# Patient Record
Sex: FEMALE | Race: WHITE | HISPANIC OR LATINO | Employment: OTHER | URBAN - METROPOLITAN AREA
[De-identification: names, ages, dates, MRNs, and addresses within clinical notes are randomized per-mention and may not be internally consistent; named-entity substitution may affect disease eponyms.]

---

## 2024-05-21 ENCOUNTER — MEDICAL CORRESPONDENCE (OUTPATIENT)
Dept: HEALTH INFORMATION MANAGEMENT | Facility: CLINIC | Age: 64
End: 2024-05-21

## 2024-05-22 ENCOUNTER — TELEPHONE (OUTPATIENT)
Dept: SURGERY | Facility: CLINIC | Age: 64
End: 2024-05-22
Payer: COMMERCIAL

## 2024-05-22 ENCOUNTER — TRANSCRIBE ORDERS (OUTPATIENT)
Dept: OTHER | Age: 64
End: 2024-05-22

## 2024-05-22 DIAGNOSIS — K64.8 INTERNAL HEMORRHOIDS: Primary | ICD-10-CM

## 2024-05-22 NOTE — TELEPHONE ENCOUNTER
Patient confirmed scheduled appointment:  Date: 6/20/24  Time: 11:00 am  Visit type: New Patient  Provider: Dr. Geller  Location: [facility]  Testing/imaging: n/a  Additional notes: referred by Dr. Sandra for hemorrhoids

## 2024-05-23 NOTE — TELEPHONE ENCOUNTER
Diagnosis, Referred by & from: [facility]    Appt date: 6/20/2024   NOTES STATUS DETAILS   OFFICE NOTE from referring provider N/A    OFFICE NOTE from other specialist Care Everywhere [facility]:   10/30/2019 OV with SAEED Geller   MEDICATION LIST Internal    LABS     ANAL PAP/CEA N/A    BIOPSIES/PATHOLOGY RELATED TO DIAGNOSIS N/A    DIAGNOSTIC PROCEDURES     PFC TESTING (from the Pelvic floor center includes Manometry, PDNL, EMG, etc.) N/A    COLONOSCOPY (most recent all time after 5 years) Care Everywhere 1/13/2020 - [facility]  11/6/2014 - [facility] SIGMOIDOSCOPY N/A    UPPER ENDOSCOPY (EGD) N/A    ERCP N/A      Records Requested  05/23/24    Facility  [facility]  Fax: [phone]   Outcome Request sent to [facility] for records     5/28/2024 [facility] BRAYAN stated the only records they have for patient is a TE with Nia regarding banding. No banding was done at [facility].       Records Requested  06/20/24    Facility    Fax:   Outcome BRAYAN representative will be sending Colonoscopy report from 1.13.2020. Done through [facility].    **Colonoscopy report has been received, sent to [facility] to be scanned into chart, marked at STAT.

## 2024-06-19 NOTE — PROGRESS NOTES
Colon and Rectal Surgery Consult Clinic Note    Date: 2024     Referring provider:  MD AN[facility]  [address] [place] [address]     RE: Nia Díaz  : 1960  MANUEL: 2024    Reason for visit: hemorrhoids     HPI: Nia Díaz is a very pleasant 63 year old female referred from [facility].     Today, Nia is feeling well.  She has had hemorrhoid banding with Dr. Sandra for the past year with dramatic improvement in her rectal bleeding.  She finds managing her dietary fiber can be tough because she will get frequent stools with too much fiber.  She notes an area of persistent hemorrhoidal prolapse that affects her with urination and defecation and interferes with travel.    Colonoscopy with polypectomy (14)   PROCEDURE DESCRIPTION: In the left lateral decubitus position, the colonoscope was inserted in the rectum and passed without difficulty to the cecum. Cecal landmarks including appendiceal orifice and ileocecal valve confirmed position. The scope was carefully withdrawn examining all areas of the colon. The prep was very good. The cecum, ascending transverse, descending and sigmoid colons were all carefully examined. Extensive sigmoid diverticulosis was noted. There was no evidence for diverticulitia. 2 sigmoid polyps were identified. They both measured approximately 5 mm in size. They were both cold snared and retrieved. Retroflexion in the rectum revealed small internal hemorrhoids. The scope was removed and the patient tolerated the procedure well. There were no immediate complications.     PLAN:  Repeat colonoscopy in 5 years     Pathology (2014):      2020 colonoscopy not yet available for review    Medical history:  No past medical history on file.    Surgical history:  No past surgical history on file.    Problem list:  There are no problems to display for this patient.      Medications:  No current outpatient  medications on file.       Allergies:  Not on File    Family history:  No family history on file.    Social history:  Social History     Tobacco Use    Smoking status: Not on file    Smokeless tobacco: Not on file   Substance Use Topics    Alcohol use: Not on file    Marital status: .  Occupation: N/A.    There are no exam notes on file for this visit.     Physical Examination:  There were no vitals taken for this visit. Exam chaperoned by aleksandr Noel assistant    General: Well appearing female, no acute distress  Perianal external examination:   Perianal skin: intact.  Lesions: No.  Eversion of buttocks: There was not evidence of an anal fissure. Details: N/A.  Skin tags or external hemorrhoids: Yes: right posterolateral mixed hemorrhoid.  Digital rectal examination: Was performed.   Sphincter tone: Good.  Palpable lesions: No.  Other: None.  Bimanual examination: was not performed    Anoscopy: Was performed.   Hemorrhoids: Yes. Prolapsing right posterolateral hemorrhoid  Lesions: No    Assessment/Plan: 63 year old female with a significant past medical history of hemorrhoidal bleeding and prolapse s/p multiple bandings.  Significant improvement in bleeding with one area of persistent prolapse which bothers her daily.  Based on this, I have recommended proceeding with a one quadrant hemorrhoidectomy.  I reviewed with her the procedure as well as the recovery.  Karie understands and agrees to proceed.       20 minutes spent on the date of encounter performing chart review, history and exam, documentation and further activities as noted above with an additional 2 minutes for anoscopy.     Joan Geller MD     Division of Colon & Rectal Surgery  [facility]         This note was created using speech recognition software and may contain unintended word substitutions.    CC: Aicha Sandra MD

## 2024-06-20 ENCOUNTER — PRE VISIT (OUTPATIENT)
Dept: SURGERY | Facility: CLINIC | Age: 64
End: 2024-06-20

## 2024-06-20 ENCOUNTER — OFFICE VISIT (OUTPATIENT)
Dept: SURGERY | Facility: CLINIC | Age: 64
End: 2024-06-20
Payer: COMMERCIAL

## 2024-06-20 VITALS
WEIGHT: 132.5 LBS | SYSTOLIC BLOOD PRESSURE: 145 MMHG | RESPIRATION RATE: 18 BRPM | DIASTOLIC BLOOD PRESSURE: 86 MMHG | OXYGEN SATURATION: 100 % | HEART RATE: 72 BPM

## 2024-06-20 DIAGNOSIS — K64.8 INTERNAL HEMORRHOIDS: ICD-10-CM

## 2024-06-20 DIAGNOSIS — K64.8 INTERNAL HEMORRHOIDS: Primary | ICD-10-CM

## 2024-06-20 PROCEDURE — 46600 DIAGNOSTIC ANOSCOPY SPX: CPT | Performed by: COLON & RECTAL SURGERY

## 2024-06-20 PROCEDURE — 99202 OFFICE O/P NEW SF 15 MIN: CPT | Mod: 25 | Performed by: COLON & RECTAL SURGERY

## 2024-06-20 NOTE — PATIENT INSTRUCTIONS
Follow up:    You can call Sarah, our surgery scheduler, directly to start the scheduling process.    Appointment you will need in prep: pre op physical with our anesthesia team or your PCP  You will need to do a 2 fleet enema bowel prep the day of surgery. You will receive the instructions in a surgery packet via mail and [employer].    MARYLOU Khan [phone]    Clinic Fax Number [phone]    Surgery Scheduling (Sarah) [phone]    My Chart is available 24 hours a day and is a secure way to access your records and communicate with your care team.  I strongly recommend signing up if you haven't already done so, if you are comfortable with computers.  If you would like to inquire about this or are having problems with My Chart access, you may call [phone] or go online at [email or web address].  Please allow at least 24 hours for a response and extra time on weekends and Holidays.

## 2024-06-20 NOTE — LETTER
2024      Nia Díaz  [address]  [place] [address]      Dear Colleague,    Thank you for referring your patient, Nia Díaz, to the [facility] [place]. Please see a copy of my visit note below.    Colon and Rectal Surgery Consult Clinic Note    Date: 2024     Referring provider:  MD ANITA [facility]  [address] [place] [address]     RE: Nia Díaz  : 1960  MANUEL: 2024    Reason for visit: hemorrhoids     HPI: Nia Díaz is a very pleasant 63 year old female referred from [facility].     Today, Nia is feeling well.  She has had hemorrhoid banding with Dr. Sandra for the past year with dramatic improvement in her rectal bleeding.  She finds managing her dietary fiber can be tough because she will get frequent stools with too much fiber.  She notes an area of persistent hemorrhoidal prolapse that affects her with urination and defecation and interferes with travel.    Colonoscopy with polypectomy (14)   PROCEDURE DESCRIPTION: In the left lateral decubitus position, the colonoscope was inserted in the rectum and passed without difficulty to the cecum. Cecal landmarks including appendiceal orifice and ileocecal valve confirmed position. The scope was carefully withdrawn examining all areas of the colon. The prep was very good. The cecum, ascending transverse, descending and sigmoid colons were all carefully examined. Extensive sigmoid diverticulosis was noted. There was no evidence for diverticulitia. 2 sigmoid polyps were identified. They both measured approximately 5 mm in size. They were both cold snared and retrieved. Retroflexion in the rectum revealed small internal hemorrhoids. The scope was removed and the patient tolerated the procedure well. There were no immediate complications.     PLAN:  Repeat colonoscopy in 5 years     Pathology (2014):      2020  colonoscopy not yet available for review    Medical history:  No past medical history on file.    Surgical history:  No past surgical history on file.    Problem list:  There are no problems to display for this patient.      Medications:  No current outpatient medications on file.       Allergies:  Not on File    Family history:  No family history on file.    Social history:  Social History     Tobacco Use     Smoking status: Not on file     Smokeless tobacco: Not on file   Substance Use Topics     Alcohol use: Not on file    Marital status: .  Occupation: N/A.    There are no exam notes on file for this visit.     Physical Examination:  There were no vitals taken for this visit. Exam chaperoned by aleksandr Noel assistant    General: Well appearing female, no acute distress  Perianal external examination:   Perianal skin: intact.  Lesions: No.  Eversion of buttocks: There was not evidence of an anal fissure. Details: N/A.  Skin tags or external hemorrhoids: Yes: right posterolateral mixed hemorrhoid.  Digital rectal examination: Was performed.   Sphincter tone: Good.  Palpable lesions: No.  Other: None.  Bimanual examination: was not performed    Anoscopy: Was performed.   Hemorrhoids: Yes. Prolapsing right posterolateral hemorrhoid  Lesions: No    Assessment/Plan: 63 year old female with a significant past medical history of hemorrhoidal bleeding and prolapse s/p multiple bandings.  Significant improvement in bleeding with one area of persistent prolapse which bothers her daily.  Based on this, I have recommended proceeding with a one quadrant hemorrhoidectomy.  I reviewed with her the procedure as well as the recovery.  Karie understands and agrees to proceed.       20 minutes spent on the date of encounter performing chart review, history and exam, documentation and further activities as noted above with an additional 2 minutes for anoscopy.     Joan Geller MD     Division of Colon &  Rectal Surgery  [facility]         This note was created using speech recognition software and may contain unintended word substitutions.    CC: Aicha Sandra MD      Again, thank you for allowing me to participate in the care of your patient.        Sincerely,        Joan Geller MD

## 2024-06-21 ENCOUNTER — TELEPHONE (OUTPATIENT)
Dept: SURGERY | Facility: CLINIC | Age: 64
End: 2024-06-21
Payer: COMMERCIAL

## 2024-06-21 PROBLEM — K64.8 INTERNAL HEMORRHOIDS: Status: ACTIVE | Noted: 2024-06-20

## 2024-06-21 NOTE — TELEPHONE ENCOUNTER
Patient is scheduled for surgery with Dr. Joan Geller    Spoke with: Karie    Date of Surgery: Friday October 11, 2024    Location: [facility] OR    Informed patient they will need an adult  YES    Pre op with Provider Dr. Joan Geller    H&P: Scheduled with PAC Video Visit on 9/26/2024 at 2:00 PM    3 week Post-op scheduled with Cely Patricia PA-C, on 11/1/2024 at the [facility]    Surgery packet: will Mail     Sarah Rogers  Aditi-op Coordinator  [facility]  Direct Phone: [phone]

## 2024-06-21 NOTE — TELEPHONE ENCOUNTER
Vm msg received from patient requesting a return call to schedule outpatient surgery with Dr. Geller at the [facility] ([facility]).    Sarah Rogers  Aditi-op Coordinator  [facility]  Direct Phone: [phone]

## 2024-06-24 NOTE — TELEPHONE ENCOUNTER
FUTURE VISIT INFORMATION      SURGERY INFORMATION:  Date: 10/11/24  [facility]  Surgeon:  Joan Geller MD   Anesthesia Type:  MAC  Procedure: HEMORRHOIDECTOMY, INTERNAL   Consult: ov 6/20/24    RECORDS REQUESTED FROM:       Primary Care Provider: [facility]

## 2024-07-28 ENCOUNTER — HEALTH MAINTENANCE LETTER (OUTPATIENT)
Age: 64
End: 2024-07-28

## 2024-09-04 NOTE — TELEPHONE ENCOUNTER
Called patient to reschedule surgery with Dr. Geller on 10/11 due to change in provider schedule.    Spoke with: Patient (Karie)    Date of Surgery: 10/23    Location of surgery: [facility]      Post-Op Appt Date w/ NP/PA:  Martha Choudhary, MARY, CNP 11/13 at 1PM      Milagro Bhatia on 9/4/2024 at 3:52 PM

## 2024-09-05 NOTE — TELEPHONE ENCOUNTER
Spoke to patient to reschedule virtual PAC appointment on 9/26 to an in person appointment due to change in surgery location.     In person PAC scheduled on 10/8 at 2PM. 9/26 PAC appointment cancelled.       Milagro Bhatia on 9/5/2024 at 3:39 PM

## 2024-09-06 NOTE — TELEPHONE ENCOUNTER
FUTURE VISIT INFORMATION        SURGERY INFORMATION:  Date: 10/11/24  [facility]  Surgeon:  Joan Geller MD   Anesthesia Type:  MAC  Procedure: HEMORRHOIDECTOMY, INTERNAL   Consult: ov 6/20/24     RECORDS REQUESTED FROM:         Primary Care Provider: [facility]

## 2024-09-26 ENCOUNTER — PRE VISIT (OUTPATIENT)
Dept: SURGERY | Facility: CLINIC | Age: 64
End: 2024-09-26

## 2024-10-08 ENCOUNTER — OFFICE VISIT (OUTPATIENT)
Dept: SURGERY | Facility: CLINIC | Age: 64
End: 2024-10-08
Payer: COMMERCIAL

## 2024-10-08 ENCOUNTER — LAB (OUTPATIENT)
Dept: LAB | Facility: CLINIC | Age: 64
End: 2024-10-08
Payer: COMMERCIAL

## 2024-10-08 ENCOUNTER — PRE VISIT (OUTPATIENT)
Dept: SURGERY | Facility: CLINIC | Age: 64
End: 2024-10-08

## 2024-10-08 VITALS
DIASTOLIC BLOOD PRESSURE: 90 MMHG | BODY MASS INDEX: 20.65 KG/M2 | TEMPERATURE: 98.4 F | OXYGEN SATURATION: 100 % | WEIGHT: 128.5 LBS | HEART RATE: 83 BPM | RESPIRATION RATE: 16 BRPM | SYSTOLIC BLOOD PRESSURE: 141 MMHG | HEIGHT: 66 IN

## 2024-10-08 DIAGNOSIS — Z01.818 PREOP EXAMINATION: ICD-10-CM

## 2024-10-08 DIAGNOSIS — Z01.818 PREOP EXAMINATION: Primary | ICD-10-CM

## 2024-10-08 DIAGNOSIS — K64.8 INTERNAL HEMORRHOIDS: ICD-10-CM

## 2024-10-08 LAB
ANION GAP SERPL CALCULATED.3IONS-SCNC: 11 MMOL/L (ref 7–15)
BUN SERPL-MCNC: 15.7 MG/DL (ref 8–23)
CALCIUM SERPL-MCNC: 9.8 MG/DL (ref 8.8–10.4)
CHLORIDE SERPL-SCNC: 104 MMOL/L (ref 98–107)
CREAT SERPL-MCNC: 0.88 MG/DL (ref 0.51–0.95)
EGFRCR SERPLBLD CKD-EPI 2021: 74 ML/MIN/1.73M2
ERYTHROCYTE [DISTWIDTH] IN BLOOD BY AUTOMATED COUNT: 13.2 % (ref 10–15)
GLUCOSE SERPL-MCNC: 148 MG/DL (ref 70–99)
HCO3 SERPL-SCNC: 27 MMOL/L (ref 22–29)
HCT VFR BLD AUTO: 43.3 % (ref 35–47)
HGB BLD-MCNC: 14.6 G/DL (ref 11.7–15.7)
MCH RBC QN AUTO: 31.1 PG (ref 26.5–33)
MCHC RBC AUTO-ENTMCNC: 33.7 G/DL (ref 31.5–36.5)
MCV RBC AUTO: 92 FL (ref 78–100)
PLATELET # BLD AUTO: 174 10E3/UL (ref 150–450)
POTASSIUM SERPL-SCNC: 3.6 MMOL/L (ref 3.4–5.3)
RBC # BLD AUTO: 4.69 10E6/UL (ref 3.8–5.2)
SODIUM SERPL-SCNC: 142 MMOL/L (ref 135–145)
WBC # BLD AUTO: 5.1 10E3/UL (ref 4–11)

## 2024-10-08 PROCEDURE — 99203 OFFICE O/P NEW LOW 30 MIN: CPT | Performed by: PHYSICIAN ASSISTANT

## 2024-10-08 PROCEDURE — 80048 BASIC METABOLIC PNL TOTAL CA: CPT | Performed by: PATHOLOGY

## 2024-10-08 PROCEDURE — 85027 COMPLETE CBC AUTOMATED: CPT | Performed by: PATHOLOGY

## 2024-10-08 PROCEDURE — 36415 COLL VENOUS BLD VENIPUNCTURE: CPT | Performed by: PATHOLOGY

## 2024-10-08 RX ORDER — ALBUTEROL SULFATE 90 UG/1
2 INHALANT RESPIRATORY (INHALATION) EVERY 4 HOURS PRN
COMMUNITY
Start: 2024-07-23

## 2024-10-08 ASSESSMENT — PAIN SCALES - GENERAL: PAINLEVEL: NO PAIN (0)

## 2024-10-08 ASSESSMENT — LIFESTYLE VARIABLES: TOBACCO_USE: 0

## 2024-10-08 ASSESSMENT — ENCOUNTER SYMPTOMS: SEIZURES: 0

## 2024-10-08 NOTE — H&P
Pre-Operative H & P     CC:  Preoperative exam to assess for increased cardiopulmonary risk while undergoing surgery and anesthesia.    Date of Encounter: 10/8/2024  Primary Care Physician:  Shyanne [place]     Reason for visit:   Encounter Diagnoses   Name Primary?    Internal hemorrhoids Yes    Preop examination        HPI  Nia Díaz is a 63 year old female who presents for pre-operative H & P in preparation for  Procedure Information       Case: [id number] Date: 10/23/24    Procedure: HEMORRHOIDECTOMY, INTERNAL (Anus)    Anesthesia type: MAC    Diagnosis: Internal hemorrhoids [K64.8]    Pre-op diagnosis: Internal hemorrhoids [K64.8]    Location: [facility]    Providers: Joan Geller MD            Patient is being evaluated for comorbid conditions of Asthma due to seasonal allergies.    Ms. Díaz has a history of internal hemorrhoids, s/p banding with dramatic improvement in her rectal bleeding. However, she notes an area of persistent hemorrhoidal prolapse that affects her with urination and defecation and interferes with travel. She now presents for the above procedure.      History is obtained from the patient and chart review    Hx of abnormal bleeding or anti-platelet use: denies    Menstrual history: No LMP recorded (lmp unknown). Patient is postmenopausal.:      Past Medical History  Past Medical History:   Diagnosis Date    Asthma due to seasonal allergies     Hepatitis C antibody test positive     Internal hemorrhoids 10/2024       Past Surgical History  Past Surgical History:   Procedure Laterality Date    COLONOSCOPY      HEMORRHOIDECTOMY BANDING         Prior to Admission Medications  Current Outpatient Medications   Medication Sig Dispense Refill    albuterol (PROAIR HFA/PROVENTIL HFA/VENTOLIN HFA) 108 (90 Base) MCG/ACT inhaler Inhale 2 puffs into the lungs every 4 hours as needed.         Allergies  No Known Allergies    Social  History  Social History     Socioeconomic History    Marital status:      Spouse name: Not on file    Number of children: Not on file    Years of education: Not on file    Highest education level: Not on file   Occupational History    Not on file   Tobacco Use    Smoking status: Never    Smokeless tobacco: Never   Substance and Sexual Activity    Alcohol use: Yes     Alcohol/week: 5.0 standard drinks of alcohol     Types: 5 Glasses of wine per week    Drug use: Not Currently    Sexual activity: Not on file   Other Topics Concern    Not on file   Social History Narrative    Not on file     Social Determinants of Health     Financial Resource Strain: Not At Risk (7/21/2024)    Received from [facility]    Financial Resource Strain     Is it hard for you to pay for the very basics like food, housing, medical care or heating?: No   Food Insecurity: Not At Risk (7/21/2024)    Received from [facility]    Food Insecurity     Does your food run out before you have the money to buy more?: No   Transportation Needs: Not At Risk (7/21/2024)    Received from [facility]    Transportation Needs     Does a lack of transportation keep you from your medical appointments or from getting your medications?: No   Physical Activity: Not on file   Stress: Not on file   Social Connections: Not on file   Interpersonal Safety: Not on file   Housing Stability: Not on file       Family History  Family History   Problem Relation Age of Onset    Anesthesia Reaction No family hx of     Deep Vein Thrombosis (DVT) No family hx of        Review of Systems  The complete review of systems is negative other than noted in the HPI or here.     Anesthesia Evaluation   Pt has had prior anesthetic.     No history of anesthetic complications       ROS/MED HX  ENT/Pulmonary:     (+)                     Intermittent, asthma (due to seasonal allergies. Rarely uses inhaler. Triggered by humidity or resp infections.)  Treatment: Inhaler prn,      "         (-) tobacco use, sleep apnea and recent URI   Neurologic:  - neg neurologic ROS  (-) no seizures and no CVA   Cardiovascular:       METS/Exercise Tolerance: >4 METS Comment: Exercises regularly, gardens, cooks.   Hematologic:  - neg hematologic  ROS  (-) history of blood clots and history of blood transfusion   Musculoskeletal:  - neg musculoskeletal ROS     GI/Hepatic: Comment: Internal hemorrhoids, s/p banding    Positive Hep C antibody       (-) GERD and liver disease   Renal/Genitourinary:  - neg Renal ROS  (-) renal disease   Endo:  - neg endo ROS  (-) Type II DM   Psychiatric/Substance Use:  - neg psychiatric ROS     Infectious Disease:  - neg infectious disease ROS     Malignancy:  - neg malignancy ROS     Other:  - neg other ROS          BP (!) 141/90 (BP Location: Right arm, Patient Position: Sitting, Cuff Size: Adult Regular)   Pulse 83   Temp 98.4  F (36.9  C) (Oral)   Resp 16   Ht 1.676 m (5' 6\")   Wt 58.3 kg (128 lb 8 oz)   LMP  (LMP Unknown)   SpO2 100%   Breastfeeding No   BMI 20.74 kg/m      Physical Exam  Constitutional: Awake, alert, cooperative, no apparent distress, and appears stated age.  Eyes: Pupils equal, round and reactive to light, extra ocular muscles intact, sclera clear, conjunctiva normal.  HENT: Normocephalic, oral pharynx with moist mucus membranes, good dentition. No goiter appreciated. No removable dental hardware.  Respiratory: Clear to auscultation bilaterally, no crackles or wheezing. No SOB when supine.  Cardiovascular: Regular rate and rhythm, normal S1 and S2, and 1/6 murmur noted.  Carotids +2, no bruits. No edema. Palpable pulses to radial, DP and PT arteries.   GI: Normal bowel sounds, soft, non-distended, non-tender, no masses palpated.    Lymph/Hematologic: No cervical lymphadenopathy and no supraclavicular lymphadenopathy.  Genitourinary:  deferred  Skin: Warm and dry.  No rashes.   Musculoskeletal: full ROM of neck. There is no redness, warmth, or " swelling of the joints. Gross motor strength is normal.    Neurologic: Awake, alert, oriented to name, place and time. Cranial nerves II-XII are grossly intact. Gait is normal. Ambulates from chair to exam table, seats self, lies supine and sits back up w/o assistance.  Neuropsychiatric: Calm, cooperative. Normal affect. Pleasant. Answers questions appropriately, follows commands w/o difficulty.        PRIOR LABS/DIAGNOSTIC STUDIES:    All labs and imaging personally reviewed        The patient's records and results personally reviewed by this provider.     Outside records reviewed from: Care Everywhere    LAB/DIAGNOSTIC STUDIES TODAY:  BMP, CBC    Assessment    Nia Díaz is a 63 year old female seen as a PAC referral for risk assessment and optimization for anesthesia.    Plan/Recommendations  Pt will be optimized for the proposed procedure.  See below for details on the assessment, risk, and preoperative recommendations    NEUROLOGY  - No history of TIA, CVA or seizure    -Post Op delirium risk factors:  No risk identified    ENT  - No current airway concerns.  Will need to be reassessed day of surgery.  Mallampati: I  TM: > 3    CARDIAC  - No history of CAD, Hypertension, and Afib  - METS (Metabolic Equivalents)  Exercises regularly, gardens, cooks.  Patient performs 4 or more METS exercise without symptoms             Total Score: 0      RCRI-Very low risk: Class 1 0.4% complication rate             Total Score: 0        PULMONARY  KATLYN Low Risk             Total Score: 1    KATLYN: Over 50 ys old      - Denies asthma or inhaler use  - Tobacco History    History   Smoking Status    Never   Smokeless Tobacco    Never       GI  - Denies GERD  - Internal hemorrhoids, s/p banding  - Positive Hep C antibody    PONV High Risk  Total Score: 3           1 AN PONV: Pt is Female    1 AN PONV: Patient is not a current smoker    1 AN PONV: Intended Post Op Opioids          ENDOCRINE    - BMI: Estimated body mass  "index is 20.74 kg/m  as calculated from the following:    Height as of this encounter: 1.676 m (5' 6\").    Weight as of this encounter: 58.3 kg (128 lb 8 oz).  Healthy Weight (BMI 18.5-24.9)  - No history of Diabetes Mellitus    HEME  VTE Low Risk 0.26%             Total Score: 1    VTE: Greater than 59 yrs old      - No history of abnormal bleeding or antiplatelet use.      MSK  Patient is NOT Frail             Total Score: 0          The patient is aware that the final anesthesia plan will be decided by the assigned anesthesia provider on the date of service.      The patient is optimized for their procedure. AVS with information on surgery time/arrival time, meds and NPO status given by nursing staff. No further diagnostic testing indicated.      On the day of service:     Prep time: 11 minutes  Visit time: 18 minutes  Documentation time: 9 minutes  ------------------------------------------  Total time: 38 minutes      Lissa Ramirez PA-C  Preoperative Assessment Center  [facility]  Clinic and Surgery Center  Phone: [phone]  Fax: [phone]    "

## 2024-10-08 NOTE — PATIENT INSTRUCTIONS
Preparing for Your Surgery      Name:  Nia Díaz   MRN:  [id number]   :  1960   Today's Date:  10/8/2024         Arriving for surgery:  Surgery date:  10/23/2024  Arrival time:  To be determined    Restrictions due to COVID 19:    Please maintain social distance.  Masking is optional   You may have 2 visitors in the pre op area.      [facility] parking is available for anyone with mobility limitations or disabilities. (Monday- Friday 7 am- 5 pm)    Please come to:    [facility]  [address] [place] [id number]    Check in on the 5th floor, Ambulatory Surgery Center.    What can I eat or drink?    -  You may eat and drink normally until 8 hours before arrival time    -  You may have clear liquids until 2 hours before arrival time      Examples of clear liquids:  Water  Clear broth  Juices (apple, white grape, white cranberry  and cider) without pulp  Noncarbonated, powder based beverages  (lemonade and Gui-Aid)  Sodas (Sprite, 7-Up, ginger ale and seltzer)  Coffee or tea (without milk or cream)  Gatorade    --No alcohol or cannabis products for at least 24 hours before surgery    Which medicines can I take?    Hold Aspirin for 7 days before surgery.   Hold Multivitamins for 7 days before surgery.  Hold Supplements for 7 days before surgery.  Hold Ibuprofen (Advil, Motrin) for 1 day before surgery--unless otherwise directed by surgeon.  Hold Naproxen (Aleve) for 4 days before surgery.      -  PLEASE TAKE the following medications the day of surgery   Albuterol if needed    How do I prepare myself?  - Please take 2 showers (one the night prior to surgery and one the morning of surgery) using Scrubcare or Hibiclens soap.    Use this soap only from the neck to your toes.     Leave the soap on your skin for one minute--then rinse thoroughly.      You may use your own shampoo and conditioner; no other hair products.   - Please remove all jewelry and body piercings.  -  No lotions, deodorants or fragrance.  - No makeup or fingernail polish.   - Bring your ID and insurance card.    -If you have a Deep Brain Stimulator, a Spinal Cord Stimulator or any implanted Neuro device you must bring the remote to the Surgery Center          ALL PATIENTS ARE REQUIRED TO HAVE A RESPONSIBLE ADULT TO DRIVE AND BE IN ATTENDANCE WITH THEM FOR 24 HOURS FOLLOWING SURGERY       Covid testing policy as of 12/06/2022  Your surgeon will notify and schedule you for a COVID test if one is needed before surgery--please direct any questions or COVID symptoms to your surgeon      Questions or Concerns:    -For questions regarding the day of surgery please contact the Ambulatory Surgery Center at [phone].    -If you have health changes between today and your surgery please contact your surgeon.     For questions after surgery please call your surgeons office.

## 2024-10-23 ENCOUNTER — TRANSFERRED RECORDS (OUTPATIENT)
Dept: HEALTH INFORMATION MANAGEMENT | Facility: CLINIC | Age: 64
End: 2024-10-23

## 2024-10-23 ENCOUNTER — LAB REQUISITION (OUTPATIENT)
Dept: LAB | Facility: CLINIC | Age: 64
End: 2024-10-23
Payer: COMMERCIAL

## 2024-10-23 PROCEDURE — 88304 TISSUE EXAM BY PATHOLOGIST: CPT | Mod: 26 | Performed by: PATHOLOGY

## 2024-10-23 PROCEDURE — 88304 TISSUE EXAM BY PATHOLOGIST: CPT | Mod: TC,ORL | Performed by: COLON & RECTAL SURGERY

## 2024-10-25 LAB
PATH REPORT.COMMENTS IMP SPEC: NORMAL
PATH REPORT.COMMENTS IMP SPEC: NORMAL
PATH REPORT.FINAL DX SPEC: NORMAL
PATH REPORT.GROSS SPEC: NORMAL
PATH REPORT.MICROSCOPIC SPEC OTHER STN: NORMAL
PATH REPORT.RELEVANT HX SPEC: NORMAL
PHOTO IMAGE: NORMAL

## 2024-11-08 ENCOUNTER — NURSE TRIAGE (OUTPATIENT)
Dept: SURGERY | Facility: CLINIC | Age: 64
End: 2024-11-08
Payer: COMMERCIAL

## 2024-11-08 NOTE — TELEPHONE ENCOUNTER
63 year old s/p HEMORRHOIDECTOMY, INTERNAL on 10/23/24   She is calling because she has noted  abnormal skin at the outer area of her anus   She states it is about 1/4 inch -abnormally shaped - pink in color  Denies pain  states it is more of an irritant   It does itch occasionally  She has removed the guaze to see if it helps and applied lanolin cream x 2  She states she is otherwise healing well from the surgery  has very little drainage   Denies fever

## 2024-11-08 NOTE — TELEPHONE ENCOUNTER
Patient called reporting red-flag symptom: post surgery on 10/23 - bump that wasn't there before    Per the P Red-Flag symptom list, patient was: warm transferred to Triage Nurse

## 2024-11-08 NOTE — TELEPHONE ENCOUNTER
Patient calling with irritated area outside of anus that she started noticing yesterday. It is not painful, just itchy and causing irritation. Slightly pink in color. Up until now she has had a gauze covering the area. She had an internal hemorrhoidectomy 10/23, little to no drainage at this point. She is using lanolin cream and it has been helping. Denies any fevers/chills. No other symptoms.    Encouraged patient to continue using a barrier cream and she does not need to cover with gauze now that there is no drainage. She is continuing to take baths every morning after a bowel movement. She already has an appointment 11/13 with JANAY Thomas. Instructed pt to keep an eye on it for now and call if severe pain or any other concerning symptoms.

## 2024-11-08 NOTE — TELEPHONE ENCOUNTER
"Reason for Disposition    Caller has NON-URGENT question and triager unable to answer question    Additional Information    Negative: Sounds like a life-threatening emergency to the triager    Negative: Chest pain    Negative: Difficulty breathing    Negative: Acting confused (e.g., disoriented, slurred speech) or excessively sleepy    Negative: Surgical incision symptoms and questions    Negative: Pain or burning with passing urine (urination) and male    Negative: Pain or burning with passing urine (urination) and female    Negative: Constipation    Negative: New or worsening leg (calf, thigh) pain    Negative: New or worsening leg swelling    Negative: Dizziness is severe, or persists > 24 hours after surgery    Negative: Symptoms arising from use of a urinary catheter (Laguerre or Coude)    Negative: Cast problems or questions    Negative: Medication question    Negative: Bright red, wide-spread, sunburn-like rash    Negative: SEVERE headache and after spinal (epidural) anesthesia    Negative: Vomiting and persists > 4 hours    Negative: Vomiting and abdomen looks much more swollen than usual    Negative: Drinking very little and dehydration suspected (e.g., no urine > 12 hours, very dry mouth, very lightheaded)    Negative: Patient sounds very sick or weak to the triager    Negative: Sounds like a serious complication to the triager    Negative: Fever > 100.4 F (38.0 C)    Negative: Caller has URGENT question and triager unable to answer question    Negative: SEVERE post-op pain (e.g., excruciating, pain scale 8-10) that is not controlled with pain medications    Negative: Headache and after spinal (epidural) anesthesia and not severe    Negative: Fever present > 3 days (72 hours)    Negative: Patient wants to be seen    Answer Assessment - Initial Assessment Questions  1. SYMPTOM: \"What's the main symptom you're concerned about?\" (e.g., pain, fever, vomiting)      Abnormal skin outer side on anus    2. ONSET: " "\"When did issue  start?\"      Noted yesterday  3. SURGERY: \"What surgery did you have?\"      HEMORRHOIDECTOMY, INTERNAL  4. DATE of SURGERY: \"When was the surgery?\"       10/23/24  5. ANESTHESIA: \" What type of anesthesia did you have?\" (e.g., general, spinal, epidural, local)      -  6. PAIN: \"Is there any pain?\" If Yes, ask: \"How bad is it?\"  (Scale 1-10; or mild, moderate, severe)      No more irritating   and occasionally itchy  7. FEVER: \"Do you have a fever?\" If Yes, ask: \"What is your temperature, how was it measured, and when did it start?\"      no  8. VOMITING: \"Is there any vomiting?\" If Yes, ask: \"How many times?\"      no  9. BLEEDING: \"Is there any bleeding?\" If Yes, ask: \"How much?\" and \"Where?\"      no  10. OTHER SYMPTOMS: \"Do you have any other symptoms?\" (e.g., drainage from wound, painful urination, constipation)        no    Protocols used: Post-Op Symptoms and [id number]    "

## 2024-11-13 ENCOUNTER — OFFICE VISIT (OUTPATIENT)
Dept: SURGERY | Facility: CLINIC | Age: 64
End: 2024-11-13
Payer: COMMERCIAL

## 2024-11-13 VITALS — OXYGEN SATURATION: 100 % | WEIGHT: 130.6 LBS | HEIGHT: 66 IN | BODY MASS INDEX: 20.99 KG/M2 | HEART RATE: 84 BPM

## 2024-11-13 DIAGNOSIS — Z09 FOLLOW-UP EXAMINATION AFTER COLORECTAL SURGERY: Primary | ICD-10-CM

## 2024-11-13 PROCEDURE — 99024 POSTOP FOLLOW-UP VISIT: CPT | Performed by: NURSE PRACTITIONER

## 2024-11-13 ASSESSMENT — PAIN SCALES - GENERAL: PAINLEVEL_OUTOF10: NO PAIN (0)

## 2024-11-13 NOTE — NURSING NOTE
"Chief Complaint   Patient presents with    Post-op Visit       Vitals:    11/13/24 1323   Pulse: 84   SpO2: 100%   Weight: 130 lb 9.6 oz   Height: 5' 6\"       Body mass index is 21.08 kg/m .    Darlin Feliz, EMT  "

## 2024-11-13 NOTE — LETTER
"2024       RE: Nia Díaz  [address] [place] [address]     Dear Colleague,    Thank you for referring your patient, Nia Díaz, to the [facility] [place] [facility]. Please see a copy of my visit note below.    Colon and Rectal Surgery Postoperative Clinic Note    RE: Nia Díaz  : 1960  MANUEL: 2024    Nia Díaz is a very pleasant 63 year old female with grade 4 prolapsing hemorrhoids who is now status post EUA with one quadrant hemorrhoidectomy on 10/23/24 by Dr. Geller.     Interval history: Karie has been doing well.  She still feels some fullness but no significant pain since Halloween.  She is having normal bowel movements.  No constipation or straining.  No fevers or chills.    Physical Examination: Exam was chaperoned by Darlin Feliz, EMT   Pulse 84   Ht 5' 6\"   Wt 130 lb 9.6 oz   LMP  (LMP Unknown)   SpO2 100%   BMI 21.08 kg/m    General: Alert, oriented, in no acute distress, sitting comfortably  HEENT: Mucous membranes moist    Perianal external examination:  Surgical site in the right posterior position almost completely healed.  No erythema or drainage.    Digital rectal examination: Was deferred.    Anoscopy: Was deferred.    Assessment/Plan:  63 year old female with grade 4 prolapsing hemorrhoids who is now status post EUA with one quadrant hemorrhoidectomy on 10/23/24 by Dr. Geller.  She is recovering well.  Still having some fullness which could be normal postoperative healing.  However, if this continues after the next few months, we will have her return to clinic for further evaluation.  Will have her start on a daily fiber supplement and recommended staying on this long-term.  She can otherwise follow-up as needed. Patient's questions were answered to her stated satisfaction and she is in agreement with this plan. " "    Medical history:  Past Medical History:   Diagnosis Date     Asthma due to seasonal allergies      Hepatitis C antibody test positive      Internal hemorrhoids 10/2024       Surgical history:  Past Surgical History:   Procedure Laterality Date     COLONOSCOPY       HEMORRHOIDECTOMY BANDING         Problem list:    Patient Active Problem List    Diagnosis Date Noted     Internal hemorrhoids 06/20/2024     Priority: Medium       Medications:  Current Outpatient Medications   Medication Sig Dispense Refill     albuterol (PROAIR HFA/PROVENTIL HFA/VENTOLIN HFA) 108 (90 Base) MCG/ACT inhaler Inhale 2 puffs into the lungs every 4 hours as needed.         Allergies:  No Known Allergies    Family history:  Family History   Problem Relation Age of Onset     Anesthesia Reaction No family hx of      Deep Vein Thrombosis (DVT) No family hx of        Social history:  Social History     Tobacco Use     Smoking status: Never     Smokeless tobacco: Never   Substance Use Topics     Alcohol use: Yes     Alcohol/week: 5.0 standard drinks of alcohol     Types: 5 Glasses of wine per week     Marital status: .    Nursing Notes:   Darlin Feliz  11/13/2024  1:24 PM  Signed  Chief Complaint   Patient presents with     Post-op Visit       Vitals:    11/13/24 1323   Pulse: 84   SpO2: 100%   Weight: 130 lb 9.6 oz   Height: 5' 6\"       Body mass index is 21.08 kg/m .    MATTHEW Jean Baptiste     15 minutes spent on the date of the encounter doing chart review, history and exam, documentation and further activities as noted above.   This is a postop visit.    MARY Khan, NP-C  Colon and Rectal Surgery  [facility]    This note was created using speech recognition software and may contain unintended word substitutions.      Again, thank you for allowing me to participate in the care of your patient.      Sincerely,    MARY Khan CNP    "

## 2024-11-13 NOTE — PROGRESS NOTES
"Colon and Rectal Surgery Postoperative Clinic Note    RE: Nia Díaz  : 1960  MANUEL: 2024    Nia Díaz is a very pleasant 63 year old female with grade 4 prolapsing hemorrhoids who is now status post EUA with one quadrant hemorrhoidectomy on 10/23/24 by Dr. Geller.     Interval history: Karie has been doing well.  She still feels some fullness but no significant pain since Halloween.  She is having normal bowel movements.  No constipation or straining.  No fevers or chills.    Physical Examination: Exam was chaperoned by Darlin Feliz, EMT   Pulse 84   Ht 5' 6\"   Wt 130 lb 9.6 oz   LMP  (LMP Unknown)   SpO2 100%   BMI 21.08 kg/m    General: Alert, oriented, in no acute distress, sitting comfortably  HEENT: Mucous membranes moist    Perianal external examination:  Surgical site in the right posterior position almost completely healed.  No erythema or drainage.    Digital rectal examination: Was deferred.    Anoscopy: Was deferred.    Assessment/Plan:  63 year old female with grade 4 prolapsing hemorrhoids who is now status post EUA with one quadrant hemorrhoidectomy on 10/23/24 by Dr. Geller.  She is recovering well.  Still having some fullness which could be normal postoperative healing.  However, if this continues after the next few months, we will have her return to clinic for further evaluation.  Will have her start on a daily fiber supplement and recommended staying on this long-term.  She can otherwise follow-up as needed. Patient's questions were answered to her stated satisfaction and she is in agreement with this plan.     Medical history:  Past Medical History:   Diagnosis Date    Asthma due to seasonal allergies     Hepatitis C antibody test positive     Internal hemorrhoids 10/2024       Surgical history:  Past Surgical History:   Procedure Laterality Date    COLONOSCOPY      HEMORRHOIDECTOMY BANDING         Problem list:    Patient Active Problem List    Diagnosis " "Date Noted    Internal hemorrhoids 06/20/2024     Priority: Medium       Medications:  Current Outpatient Medications   Medication Sig Dispense Refill    albuterol (PROAIR HFA/PROVENTIL HFA/VENTOLIN HFA) 108 (90 Base) MCG/ACT inhaler Inhale 2 puffs into the lungs every 4 hours as needed.         Allergies:  No Known Allergies    Family history:  Family History   Problem Relation Age of Onset    Anesthesia Reaction No family hx of     Deep Vein Thrombosis (DVT) No family hx of        Social history:  Social History     Tobacco Use    Smoking status: Never    Smokeless tobacco: Never   Substance Use Topics    Alcohol use: Yes     Alcohol/week: 5.0 standard drinks of alcohol     Types: 5 Glasses of wine per week     Marital status: .    Nursing Notes:   Darlin Feliz  11/13/2024  1:24 PM  Signed  Chief Complaint   Patient presents with    Post-op Visit       Vitals:    11/13/24 1323   Pulse: 84   SpO2: 100%   Weight: 130 lb 9.6 oz   Height: 5' 6\"       Body mass index is 21.08 kg/m .    MATTHEW Jean Baptiste     15 minutes spent on the date of the encounter doing chart review, history and exam, documentation and further activities as noted above.   This is a postop visit.    MARY Hoffman, NP-C  Colon and Rectal Surgery  [facility]    This note was created using speech recognition software and may contain unintended word substitutions.    "

## 2025-08-10 ENCOUNTER — HEALTH MAINTENANCE LETTER (OUTPATIENT)
Age: 65
End: 2025-08-10